# Patient Record
Sex: FEMALE | URBAN - METROPOLITAN AREA
[De-identification: names, ages, dates, MRNs, and addresses within clinical notes are randomized per-mention and may not be internally consistent; named-entity substitution may affect disease eponyms.]

---

## 2022-07-12 ENCOUNTER — HOSPITAL ENCOUNTER (EMERGENCY)
Age: 57
Discharge: LEFT BEFORE DISCHARGE | End: 2022-07-12
Payer: COMMERCIAL

## 2022-07-12 PROCEDURE — 9990000010 HC NO CHARGE VISIT

## 2022-07-12 PROCEDURE — 93005 ELECTROCARDIOGRAM TRACING: CPT | Performed by: STUDENT IN AN ORGANIZED HEALTH CARE EDUCATION/TRAINING PROGRAM

## 2022-07-12 NOTE — ED NOTES
Pt had ekg completed. Refusing vitals. Signed ama paperwork.       Melissa Calderon RN  07/12/22 [id number]

## 2022-07-15 LAB
EKG ATRIAL RATE: 71 BPM
EKG P AXIS: 60 DEGREES
EKG P-R INTERVAL: 126 MS
EKG Q-T INTERVAL: 370 MS
EKG QRS DURATION: 72 MS
EKG QTC CALCULATION (BAZETT): 402 MS
EKG R AXIS: 84 DEGREES
EKG T AXIS: 55 DEGREES
EKG VENTRICULAR RATE: 71 BPM